# Patient Record
Sex: MALE | Race: WHITE | Employment: FULL TIME | URBAN - METROPOLITAN AREA
[De-identification: names, ages, dates, MRNs, and addresses within clinical notes are randomized per-mention and may not be internally consistent; named-entity substitution may affect disease eponyms.]

---

## 2017-01-03 ENCOUNTER — OFFICE VISIT (OUTPATIENT)
Dept: FAMILY MEDICINE CLINIC | Facility: CLINIC | Age: 52
End: 2017-01-03

## 2017-01-03 VITALS
BODY MASS INDEX: 30 KG/M2 | DIASTOLIC BLOOD PRESSURE: 60 MMHG | RESPIRATION RATE: 20 BRPM | SYSTOLIC BLOOD PRESSURE: 98 MMHG | OXYGEN SATURATION: 98 % | HEART RATE: 87 BPM | WEIGHT: 191 LBS | TEMPERATURE: 99 F

## 2017-01-03 DIAGNOSIS — J11.1 INFLUENZA-LIKE ILLNESS: Primary | ICD-10-CM

## 2017-01-03 PROCEDURE — 99202 OFFICE O/P NEW SF 15 MIN: CPT | Performed by: NURSE PRACTITIONER

## 2017-01-03 RX ORDER — CODEINE PHOSPHATE AND GUAIFENESIN 10; 100 MG/5ML; MG/5ML
10 SOLUTION ORAL NIGHTLY PRN
Qty: 118 ML | Refills: 0 | Status: SHIPPED | OUTPATIENT
Start: 2017-01-03 | End: 2017-01-10

## 2017-01-03 RX ORDER — PREDNISONE 20 MG/1
20 TABLET ORAL 2 TIMES DAILY
Qty: 10 TABLET | Refills: 0 | Status: SHIPPED | OUTPATIENT
Start: 2017-01-03 | End: 2017-01-08

## 2017-01-03 RX ORDER — ALBUTEROL SULFATE 90 UG/1
2 AEROSOL, METERED RESPIRATORY (INHALATION) EVERY 4 HOURS PRN
Qty: 1 INHALER | Refills: 0 | Status: SHIPPED | OUTPATIENT
Start: 2017-01-03 | End: 2017-01-17

## 2017-01-03 NOTE — PATIENT INSTRUCTIONS
Humidifier in room  Sleep propped  Push fluids  Limit dairy  Mucinex as directed  Sudafed as needed  Follow up Friday if you are feeling worse    Influenza (Adult)    Influenza is also called the flu.  It is a viral illness that affects the air passages o Follow up with your healthcare provider, or as advised, if you are not getting better over the next week. If you are 72 or older, talk with your provider about getting a pneumococcal vaccine every 5 years.  You should also get this vaccine if you have  · If symptoms are severe, rest at home for the first 2 to 3 days. When you resume activity, don't let yourself get too tired. · Avoid being exposed to cigarette smoke (yours or others’).   · You may use acetaminophen or ibuprofen to control pain and fever, © 7396-5277 The [address] [place] [address], [address]. All rights reserved. This information is not intended as a substitute for professional medical care. Always follow your healthcare professional's instructions.         Using a

## 2017-01-03 NOTE — PROGRESS NOTES
Patient presents with:  Nasal Congestion: runny nose, post nasal drip, coughing and wheezing x 1 mo on /off but has worsen over the last 3 days  :    HPI:   Richard Martel is a 46year old male who presents for upper respiratory symptoms for  3  days.  Sta BP 98/60 mmHg  Pulse 87  Temp(Src) 99.4 °F (37.4 °C) (Oral)  Resp 20  Wt 191 lb  SpO2 98%  GENERAL: well developed, well nourished, in no apparent distress, acutely sick. SKIN: no rashes,no suspicious lesions, flushed. Warm to touch.   HEAD: atraumatic, no Sig: Inhale 2 puffs into the lungs every 4 (four) hours as needed for Wheezing or Shortness of Breath.      guaiFENesin-codeine (CHERATUSSIN AC) 100-10 MG/5ML Oral Solution 118 mL 0      Sig: Take 10 mL by mouth nightly as needed for cough.       predniS · Over-the-counter cold medicines will not make the flu go away faster. But the medicines may help with coughing, sore throat, and congestion in your nose and sinuses. Don’t use a decongestant if you have high blood pressure.   · Stay home until your fever This illness is contagious during the first few days. It is spread through the air by coughing and sneezing. It may also be spread by direct contact (touching the sick person and then touching your own eyes, nose, or mouth).  Frequent handwashing will decre · Difficulty swallowing due to throat pain  · Fever of 100.4ºF (38ºC) or higher, or as directed by your healthcare provider  Call 911, or get immediate medical care  Call emergency services right away if any of these occur:  · Chest pain, shortness of reggie Date Last Reviewed: 10/1/2016  © 9551-2634 The [address] [place] [address], [address] [place], [place] [address]. All rights reserved. This information is not intended as a substitute for professional medical care.  Always follow your healthcare professional

## 2017-12-12 PROBLEM — R73.9 HYPERGLYCEMIA: Status: ACTIVE | Noted: 2017-12-12

## 2020-06-12 PROBLEM — F17.200 NICOTINE DEPENDENCE: Status: ACTIVE | Noted: 2020-06-12

## 2020-12-15 PROBLEM — F17.200 NICOTINE DEPENDENCE: Status: RESOLVED | Noted: 2020-06-12 | Resolved: 2020-12-15

## 2021-12-27 ENCOUNTER — IMMUNIZATION (OUTPATIENT)
Dept: LAB | Facility: HOSPITAL | Age: 56
End: 2021-12-27
Attending: EMERGENCY MEDICINE
Payer: COMMERCIAL

## 2021-12-27 DIAGNOSIS — Z23 NEED FOR VACCINATION: Primary | ICD-10-CM

## 2021-12-27 PROCEDURE — 0064A SARSCOV2 VAC 50MCG/0.25ML IM: CPT

## (undated) NOTE — MR AVS SNAPSHOT
[facility]  [address]  [phone]               Thank you for choosing us for your health care visit with Gurinder Rehman NP. We are glad to serve you and happy to provide you with this summary of your visit.   Ple Aspirin can harm the liver. · Nausea and loss of appetite are common with the flu. Eat light meals. Drink 6 to 8 glasses of liquids every day. Good choices are water, sport drinks, soft drinks without caffeine, juices, tea, and soup.  Extra fluids will als for the common cold. When the infection causes a lot of irritation, the air passages can go into spasm. This causes wheezing and shortness of breath. This illness is contagious during the first few days.  It is spread through the air by coughing and snee Call your healthcare provider right away if any of these occur:  · Cough with lots of colored sputum (mucus)  · Severe headache; face, neck, or ear pain  · Difficulty swallowing due to throat pain  · Fever of 100.4ºF (38ºC) or higher, or as directed by you · Your healthcare provider or pharmacist can show you how to use your inhaler the right way. Even if you think you are using it the right way, it is still a good idea to check.    Date Last Reviewed: 10/1/2016  © 6187-4779 The [place] [facility] [address] [place] [id number]    Hours:  24-hours Phone:  [phone]    - Albuterol Sulfate  (90 BASE) MCG/ACT Aers  - predniSONE 20 MG Tabs      You can get these medications from any pharmacy     Bring a paper prescription for each of the